# Patient Record
Sex: MALE
[De-identification: names, ages, dates, MRNs, and addresses within clinical notes are randomized per-mention and may not be internally consistent; named-entity substitution may affect disease eponyms.]

---

## 2023-08-08 ENCOUNTER — APPOINTMENT (OUTPATIENT)
Dept: GASTROENTEROLOGY | Facility: CLINIC | Age: 38
End: 2023-08-08
Payer: MEDICAID

## 2023-08-08 VITALS
TEMPERATURE: 98.3 F | WEIGHT: 137 LBS | SYSTOLIC BLOOD PRESSURE: 104 MMHG | HEIGHT: 66.93 IN | RESPIRATION RATE: 16 BRPM | OXYGEN SATURATION: 98 % | BODY MASS INDEX: 21.5 KG/M2 | DIASTOLIC BLOOD PRESSURE: 60 MMHG | HEART RATE: 68 BPM

## 2023-08-08 DIAGNOSIS — Z78.9 OTHER SPECIFIED HEALTH STATUS: ICD-10-CM

## 2023-08-08 PROBLEM — Z00.00 ENCOUNTER FOR PREVENTIVE HEALTH EXAMINATION: Status: ACTIVE | Noted: 2023-08-08

## 2023-08-08 PROCEDURE — 99204 OFFICE O/P NEW MOD 45 MIN: CPT

## 2023-08-13 PROBLEM — Z78.9 NON-SMOKER: Status: ACTIVE | Noted: 2023-08-13

## 2023-08-13 RX ORDER — FAMOTIDINE 20 MG/1
20 TABLET, FILM COATED ORAL
Refills: 0 | Status: ACTIVE | COMMUNITY

## 2023-08-13 NOTE — PHYSICAL EXAM
[Normal] : no respiratory distress, no accessory muscle use, normal respiratory rhythm and effort, lungs were clear to auscultation bilaterally [Epigastric] : epigastric

## 2023-08-13 NOTE — CONSULT LETTER
[Dear  ___] : Dear  [unfilled], [Consult Letter:] : I had the pleasure of evaluating your patient, [unfilled]. [( Thank you for referring [unfilled] for consultation for _____ )] : Thank you for referring [unfilled] for consultation for [unfilled] [Please see my note below.] : Please see my note below. [Consult Closing:] : Thank you very much for allowing me to participate in the care of this patient.  If you have any questions, please do not hesitate to contact me. [Sincerely,] : Sincerely, [FreeTextEntry3] : Mehran Castillo MD  Gastroenterology [facility]

## 2023-08-13 NOTE — HISTORY OF PRESENT ILLNESS
[FreeTextEntry1] : He is a 37 year old male with a recent onset  of mid epigastric  abdominal pain associated with heartburn. He denies  NSAID use. He was started on Pepcid which only slightly improved his symptoms. He denies that the pain is related to food intake.

## 2023-09-11 ENCOUNTER — APPOINTMENT (OUTPATIENT)
Dept: GASTROENTEROLOGY | Facility: CLINIC | Age: 38
End: 2023-09-11
Payer: MEDICAID

## 2023-09-11 VITALS
HEIGHT: 66.93 IN | BODY MASS INDEX: 20.88 KG/M2 | DIASTOLIC BLOOD PRESSURE: 70 MMHG | OXYGEN SATURATION: 99 % | SYSTOLIC BLOOD PRESSURE: 100 MMHG | WEIGHT: 133 LBS | RESPIRATION RATE: 14 BRPM | TEMPERATURE: 98 F | HEART RATE: 70 BPM

## 2023-09-11 DIAGNOSIS — K21.9 GASTRO-ESOPHAGEAL REFLUX DISEASE W/OUT ESOPHAGITIS: ICD-10-CM

## 2023-09-11 DIAGNOSIS — R10.13 EPIGASTRIC PAIN: ICD-10-CM

## 2023-09-11 PROCEDURE — 99214 OFFICE O/P EST MOD 30 MIN: CPT

## 2023-09-11 RX ORDER — PANTOPRAZOLE 40 MG/1
40 TABLET, DELAYED RELEASE ORAL
Qty: 30 | Refills: 4 | Status: ACTIVE | COMMUNITY
Start: 2023-09-11 | End: 1900-01-01

## 2023-09-11 RX ORDER — DICYCLOMINE HYDROCHLORIDE 20 MG/1
20 TABLET ORAL
Qty: 90 | Refills: 3 | Status: ACTIVE | COMMUNITY
Start: 2023-09-11 | End: 1900-01-01

## 2023-09-18 ENCOUNTER — APPOINTMENT (OUTPATIENT)
Dept: GASTROENTEROLOGY | Facility: AMBULATORY SURGERY CENTER | Age: 38
End: 2023-09-18
Payer: MEDICAID

## 2023-09-18 ENCOUNTER — LABORATORY RESULT (OUTPATIENT)
Age: 38
End: 2023-09-18

## 2023-09-18 PROCEDURE — 43239 EGD BIOPSY SINGLE/MULTIPLE: CPT

## 2023-09-26 ENCOUNTER — APPOINTMENT (OUTPATIENT)
Dept: GASTROENTEROLOGY | Facility: AMBULATORY SURGERY CENTER | Age: 38
End: 2023-09-26

## 2023-10-25 ENCOUNTER — APPOINTMENT (OUTPATIENT)
Dept: GASTROENTEROLOGY | Facility: CLINIC | Age: 38
End: 2023-10-25

## 2024-08-15 NOTE — ASSESSMENT
[FreeTextEntry1] : R/O Peptic Ulcer Disease, gastritis versus Irritable Bowel Syndrome with reflux.  ANNMARIE CISNEROS was advised to undergo endoscopy to which he agreed. The procedure will be performed in [facility] with the assistance of an anesthesiologist. He was given a booklet distributed by the [employer] explaining the procedure in detail and he understood the risks of the procedure not limited to infection, bleeding, perforation or non- diagnosis of gastric or esophageal cancer.  He was advised that he could not drive home, if he chooses to receive sedation. Further diagnostic and treatment recommendations will be based upon the procedure and any biopsies, if they are taken. Thank you for allowing me to participate in this [facility] health care.   I spent 46 minutes with the patient and answered all of his questions. PAST MEDICAL HISTORY:  No pertinent past medical history